# Patient Record
Sex: MALE | Race: BLACK OR AFRICAN AMERICAN | URBAN - METROPOLITAN AREA
[De-identification: names, ages, dates, MRNs, and addresses within clinical notes are randomized per-mention and may not be internally consistent; named-entity substitution may affect disease eponyms.]

---

## 2019-05-02 ENCOUNTER — EMERGENCY (EMERGENCY)
Facility: HOSPITAL | Age: 29
LOS: 1 days | Discharge: HOME | End: 2019-05-02
Admitting: EMERGENCY MEDICINE
Payer: MEDICAID

## 2019-05-02 VITALS
HEART RATE: 85 BPM | DIASTOLIC BLOOD PRESSURE: 68 MMHG | OXYGEN SATURATION: 97 % | RESPIRATION RATE: 16 BRPM | TEMPERATURE: 99 F | SYSTOLIC BLOOD PRESSURE: 105 MMHG

## 2019-05-02 DIAGNOSIS — R05 COUGH: ICD-10-CM

## 2019-05-02 DIAGNOSIS — Z91.013 ALLERGY TO SEAFOOD: ICD-10-CM

## 2019-05-02 DIAGNOSIS — F17.210 NICOTINE DEPENDENCE, CIGARETTES, UNCOMPLICATED: ICD-10-CM

## 2019-05-02 DIAGNOSIS — R07.89 OTHER CHEST PAIN: ICD-10-CM

## 2019-05-02 DIAGNOSIS — R06.02 SHORTNESS OF BREATH: ICD-10-CM

## 2019-05-02 LAB
ALBUMIN SERPL ELPH-MCNC: 4.1 G/DL — SIGNIFICANT CHANGE UP (ref 3.4–5)
ALP SERPL-CCNC: 79 U/L — SIGNIFICANT CHANGE UP (ref 40–120)
ALT FLD-CCNC: 21 U/L — SIGNIFICANT CHANGE UP (ref 12–42)
ANION GAP SERPL CALC-SCNC: 7 MMOL/L — LOW (ref 9–16)
APTT BLD: 32.4 SEC — SIGNIFICANT CHANGE UP (ref 27.5–36.3)
AST SERPL-CCNC: 18 U/L — SIGNIFICANT CHANGE UP (ref 15–37)
BASOPHILS NFR BLD AUTO: 0.3 % — SIGNIFICANT CHANGE UP (ref 0–2)
BILIRUB SERPL-MCNC: 0.2 MG/DL — SIGNIFICANT CHANGE UP (ref 0.2–1.2)
BUN SERPL-MCNC: 19 MG/DL — SIGNIFICANT CHANGE UP (ref 7–23)
CALCIUM SERPL-MCNC: 9.4 MG/DL — SIGNIFICANT CHANGE UP (ref 8.5–10.5)
CHLORIDE SERPL-SCNC: 104 MMOL/L — SIGNIFICANT CHANGE UP (ref 96–108)
CK SERPL-CCNC: 163 U/L — SIGNIFICANT CHANGE UP (ref 39–308)
CO2 SERPL-SCNC: 28 MMOL/L — SIGNIFICANT CHANGE UP (ref 22–31)
CREAT SERPL-MCNC: 1.04 MG/DL — SIGNIFICANT CHANGE UP (ref 0.5–1.3)
EOSINOPHIL NFR BLD AUTO: 0.8 % — SIGNIFICANT CHANGE UP (ref 0–6)
GLUCOSE SERPL-MCNC: 82 MG/DL — SIGNIFICANT CHANGE UP (ref 70–99)
HCT VFR BLD CALC: 45 % — SIGNIFICANT CHANGE UP (ref 39–50)
HGB BLD-MCNC: 15.6 G/DL — SIGNIFICANT CHANGE UP (ref 13–17)
IMM GRANULOCYTES NFR BLD AUTO: 0.3 % — SIGNIFICANT CHANGE UP (ref 0–1.5)
INR BLD: 1.04 — SIGNIFICANT CHANGE UP (ref 0.88–1.16)
LYMPHOCYTES # BLD AUTO: 29.3 % — SIGNIFICANT CHANGE UP (ref 13–44)
MAGNESIUM SERPL-MCNC: 2 MG/DL — SIGNIFICANT CHANGE UP (ref 1.6–2.6)
MCHC RBC-ENTMCNC: 28.6 PG — SIGNIFICANT CHANGE UP (ref 27–34)
MCHC RBC-ENTMCNC: 34.7 G/DL — SIGNIFICANT CHANGE UP (ref 32–36)
MCV RBC AUTO: 82.4 FL — SIGNIFICANT CHANGE UP (ref 80–100)
MONOCYTES NFR BLD AUTO: 8.9 % — SIGNIFICANT CHANGE UP (ref 2–14)
NEUTROPHILS NFR BLD AUTO: 60.4 % — SIGNIFICANT CHANGE UP (ref 43–77)
NT-PROBNP SERPL-SCNC: 8 PG/ML — SIGNIFICANT CHANGE UP
PLATELET # BLD AUTO: 215 K/UL — SIGNIFICANT CHANGE UP (ref 150–400)
POTASSIUM SERPL-MCNC: 4.2 MMOL/L — SIGNIFICANT CHANGE UP (ref 3.5–5.3)
POTASSIUM SERPL-SCNC: 4.2 MMOL/L — SIGNIFICANT CHANGE UP (ref 3.5–5.3)
PROT SERPL-MCNC: 7.7 G/DL — SIGNIFICANT CHANGE UP (ref 6.4–8.2)
PROTHROM AB SERPL-ACNC: 11.6 SEC — SIGNIFICANT CHANGE UP (ref 10–12.9)
RBC # BLD: 5.46 M/UL — SIGNIFICANT CHANGE UP (ref 4.2–5.8)
RBC # FLD: 13.2 % — SIGNIFICANT CHANGE UP (ref 10.3–14.5)
SODIUM SERPL-SCNC: 139 MMOL/L — SIGNIFICANT CHANGE UP (ref 132–145)
TROPONIN I SERPL-MCNC: <0.017 NG/ML — LOW (ref 0.02–0.06)
WBC # BLD: 7.5 K/UL — SIGNIFICANT CHANGE UP (ref 3.8–10.5)
WBC # FLD AUTO: 7.5 K/UL — SIGNIFICANT CHANGE UP (ref 3.8–10.5)

## 2019-05-02 PROCEDURE — 71046 X-RAY EXAM CHEST 2 VIEWS: CPT | Mod: 26

## 2019-05-02 PROCEDURE — 99284 EMERGENCY DEPT VISIT MOD MDM: CPT

## 2019-05-02 PROCEDURE — 93010 ELECTROCARDIOGRAM REPORT: CPT | Mod: 59

## 2019-05-02 PROCEDURE — 99285 EMERGENCY DEPT VISIT HI MDM: CPT | Mod: 25

## 2019-05-02 RX ORDER — ASPIRIN/CALCIUM CARB/MAGNESIUM 324 MG
325 TABLET ORAL ONCE
Qty: 0 | Refills: 0 | Status: COMPLETED | OUTPATIENT
Start: 2019-05-02 | End: 2019-05-02

## 2019-05-02 RX ORDER — SODIUM CHLORIDE 9 MG/ML
1000 INJECTION INTRAMUSCULAR; INTRAVENOUS; SUBCUTANEOUS ONCE
Qty: 0 | Refills: 0 | Status: COMPLETED | OUTPATIENT
Start: 2019-05-02 | End: 2019-05-02

## 2019-05-02 RX ORDER — ASPIRIN/CALCIUM CARB/MAGNESIUM 324 MG
1 TABLET ORAL
Qty: 30 | Refills: 0 | OUTPATIENT
Start: 2019-05-02 | End: 2019-05-31

## 2019-05-02 RX ADMIN — Medication 325 MILLIGRAM(S): at 16:15

## 2019-05-02 RX ADMIN — SODIUM CHLORIDE 2000 MILLILITER(S): 9 INJECTION INTRAMUSCULAR; INTRAVENOUS; SUBCUTANEOUS at 16:15

## 2019-05-02 NOTE — ED PROVIDER NOTE - CLINICAL SUMMARY MEDICAL DECISION MAKING FREE TEXT BOX
pt with chest pain x 1 mo; pt with ekg with early repole however no clear signs of pericarditis and pt well appearing.  labs and ekg, cxr.  ddx includes costochondritis, pericarditis; no suspicion for acs or pe at this time.  cardiology also to see pt

## 2019-05-02 NOTE — ED PROVIDER NOTE - OBJECTIVE STATEMENT
28 y/o male with PMHx of pericarditis (hospitalized and diagnosed 1 month ago, sent home on Ibuprofen) presents to ED c/o CP. Patient reports intermittent episodes of CP, but states current episode began today. Reports CP with associated SOB and endorses cough for the past week. Denies any fever, abd pain, nausea, vomiting, leg swelling or pain, IV drug use, recent travel, or recent surgeries. States he was at another hospital last week, had EKG done and given Ibuprofen. States he has been taking Ibuprofen without symptomatic relief. Patient admits to smoking cigarettes, and is currently in rehab for marijuana.

## 2019-05-02 NOTE — ED PROVIDER NOTE - CARE PROVIDERS DIRECT ADDRESSES
[email or web address]

## 2019-05-02 NOTE — CONSULT NOTE ADULT - SUBJECTIVE AND OBJECTIVE BOX
[facility] Cardiology Consultation    CHIEF COMPLAINT: CP     HISTORY OF PRESENT ILLNESS: 28 y/o male with history of pericarditis presents secondary to chest discomfort. Symptoms noted for a few months. He is in rehab currently. Feeling better.     PAST MEDICAL & SURGICAL HISTORY:  none      Allergies No Known Drug Allergies  Seafood (Unknown)    MEDICATIONS:  Advil    FAMILY HISTORY: no CAD    SOCIAL HISTORY:  no EtOH, tobacco or drug use    REVIEW OF SYSTEMS:  CONSTITUTIONAL: No fever, weight loss, or fatigue  EYES: No eye pain, visual disturbances, or discharge  ENMT:  No difficulty hearing, tinnitus, vertigo; No sinus or throat pain  NECK: No pain or stiffness  BREASTS: No pain, masses, or nipple discharge  RESPIRATORY: No cough, wheezing, chills or hemoptysis; No Shortness of Breath  CARDIOVASCULAR: No chest pain, palpitations, dizziness, or leg swelling  GASTROINTESTINAL: No abdominal or epigastric pain. No nausea, vomiting, or hematemesis; No diarrhea or constipation. No melena or hematochezia.  GENITOURINARY: No dysuria, frequency, hematuria, or incontinence  NEUROLOGICAL: No headaches, memory loss, loss of strength, numbness, or tremors  SKIN: No itching, burning, rashes, or lesions   LYMPH Nodes: No enlarged glands  ENDOCRINE: No heat or cold intolerance; No hair loss  MUSCULOSKELETAL: No joint pain or swelling; No muscle, back, or extremity pain  PSYCHIATRIC: No depression, anxiety, mood swings, or difficulty sleeping  HEME/LYMPH: No easy bruising, or bleeding gums  ALLERY AND IMMUNOLOGIC: No hives or eczema	      PHYSICAL EXAM:  T(C): 37.2 (05-02-19 @ 15:24), Max: 37.2 (05-02-19 @ 15:24)  HR: 85 (05-02-19 @ 15:24) (85 - 85)  BP: 105/68 (05-02-19 @ 15:24) (105/68 - 105/68)  RR: 16 (05-02-19 @ 15:24) (16 - 16)  SpO2: 97% (05-02-19 @ 15:24) (97% - 97%)  Wt(kg): --  I&O's Summary    Appearance: Normal	  HEENT:   Normal oral mucosa, PERRL, EOMI	  Lymphatic: No lymphadenopathy  Cardiovascular: Normal S1 S2, No JVD, No murmurs, No edema  Respiratory: Lungs clear to auscultation	  Psychiatry: A & O x 3, Mood & affect appropriate  Gastrointestinal:  Soft, Non-tender, + BS	  Skin: No rashes, No ecchymoses, No cyanosis	  Neurologic: Non-focal  Extremities: Normal range of motion, No clubbing, cyanosis or edema  Vascular: Peripheral pulses palpable 2+ bilaterally  	    ECG:  	SR no ST_T changes    LABS:	 	  CARDIAC MARKERS:  Troponin I, Serum: <0.017 ng/mL (05-02 @ 16:15)                      15.6   7.5   )-----------( 215      ( 02 May 2019 16:15 )             45.0     05-02    139  |  104  |  19  ----------------------------<  82  4.2   |  28  |  1.04    Ca    9.4      02 May 2019 16:15  Mg     2.0     05-02    TPro  7.7  /  Alb  4.1  /  TBili  0.2  /  DBili  x   /  AST  18  /  ALT  21  /  AlkPhos  79  05-02    proBNP: Serum Pro-Brain Natriuretic Peptide: 8 pg/mL (05-02 @ 16:15)        ASSESSMENT/PLAN: 	28 y/o male with atypical chest discomfort  1. patient seen and examined, chart reviewed  2. limited echo done c/w preserved LV systolic function, no evidence of effusion  3. cont ASA  4. stable for discharge  5. will see in the office    I spent 45 min in care of this patient

## 2019-05-02 NOTE — ED PROVIDER NOTE - CARE PROVIDER_API CALL
Maddie Gayle)  [address] [address]  Phone: [phone]  Fax: [phone]  Follow Up Time:     Rashel Sr)  Cardiovascular Disease  [address] [place] [address], [address] [place] [address]  Phone: [phone]  Fax: [phone]  Follow Up Time:

## 2019-05-02 NOTE — ED PROVIDER NOTE - NOTES
bedside sono done and with no effusion and otherwise unremarkable.  will see pt for f/u. will give aspirin for pain

## 2019-05-02 NOTE — SBIRT NOTE. - NSSBIRTSERVICES_GEN_A_ED_FT
Provided SBIRT services. Full Screen Positive. Brief Intervention Performed. Screening results were reviewed with the patient and patient was provided information about healthy guidelines and potential negative consequences associated with level of risk.   Motivation and readiness to reduce or stop use was discussed and goals and activities to make changes were suggested/offered.    Audit Score : 3    Dast Score : 2    Duration : 15 minutes

## 2019-05-03 PROBLEM — Z00.00 ENCOUNTER FOR PREVENTIVE HEALTH EXAMINATION: Status: ACTIVE | Noted: 2019-05-03

## 2019-05-08 ENCOUNTER — APPOINTMENT (OUTPATIENT)
Dept: HEART AND VASCULAR | Facility: CLINIC | Age: 29
End: 2019-05-08

## 2019-05-31 ENCOUNTER — APPOINTMENT (OUTPATIENT)
Dept: HEART AND VASCULAR | Facility: CLINIC | Age: 29
End: 2019-05-31

## 2024-03-23 PROBLEM — I31.9 DISEASE OF PERICARDIUM, UNSPECIFIED: Chronic | Status: ACTIVE | Noted: 2019-05-02

## 2024-06-04 NOTE — ED ADULT NURSE NOTE - NEURO WDL
6/4/24    Ariel Ac  1960    SUBJECTIVE    HPI - Ariel is a 63 y.o. male who presents today for evaluation of:  Chief Complaint   Patient presents with    Follow-up     DVT     HTN : He has not been taking the triamterene hydrochlorothiazide and states that he has most of the supply.  He has a wrist blood pressure cuff at home but does not use it.    Hx multiple DVT : takes 5 mg most days and 7.5 mg once a day.     Review of Systems   Constitutional:  Negative for fatigue and fever.   Cardiovascular:  Negative for chest pain and palpitations.   Gastrointestinal:  Negative for abdominal pain, constipation, diarrhea, nausea and vomiting.   Neurological:  Negative for dizziness, seizures, syncope, speech difficulty, light-headedness and headaches.   Psychiatric/Behavioral:  Positive for sleep disturbance.          No Known Allergies     OBJECTIVE    BP (!) 162/84 (Site: Left Upper Arm, Position: Sitting, Cuff Size: Large Adult)   Pulse 84   Ht 1.753 m (5' 9\")   Wt 112.6 kg (248 lb 3.2 oz)   SpO2 97%   BMI 36.65 kg/m²     Physical Exam   Constitutional:       General: Not in acute distress.     Appearance: Normal appearance. Not ill-appearing.   Eyes:      General: No scleral icterus.  Cardiovascular:      Rate and Rhythm: Normal rate and regular rhythm.      Heart sounds: No murmur heard.   No friction rub. No gallop.    Pulmonary:      Effort: Pulmonary effort is normal. No respiratory distress.      Breath sounds: No wheezing, rhonchi or rales.   Abdominal:      Palpations: Abdomen is soft. There is no mass.      Tenderness: There is no abdominal tenderness.   Musculoskeletal:     Moves all extremities normally.    Skin:     General: Skin is warm.      Coloration: Skin is not jaundiced.   Neurological:      Mental Status: Patient is alert.   Psychiatric:         Behavior: Behavior normal.         Thought Content: Thought content normal.         Judgment: Judgment normal.    Poct INR 3.0    
Alert and oriented to person, place and time, memory intact, behavior appropriate to situation, PERRL.
